# Patient Record
Sex: FEMALE | Race: WHITE | NOT HISPANIC OR LATINO | Employment: UNEMPLOYED | URBAN - METROPOLITAN AREA
[De-identification: names, ages, dates, MRNs, and addresses within clinical notes are randomized per-mention and may not be internally consistent; named-entity substitution may affect disease eponyms.]

---
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
## 2024-01-30 ENCOUNTER — TELEPHONE (OUTPATIENT)
Dept: PEDIATRICS | Facility: CLINIC | Age: 19
End: 2024-01-30
Payer: COMMERCIAL

## 2024-01-30 DIAGNOSIS — F41.9 ANXIETY: ICD-10-CM

## 2024-01-30 RX ORDER — HYDROXYZINE HYDROCHLORIDE 25 MG/1
TABLET, FILM COATED ORAL
Qty: 60 TABLET | Refills: 1 | OUTPATIENT
Start: 2024-01-30

## 2024-01-30 NOTE — TELEPHONE ENCOUNTER
Pt not seen in over 1 year  Dr. Malone no longer at clinic  Pt needs appt before refill can be provided    NW

## 2024-02-09 ENCOUNTER — TELEPHONE (OUTPATIENT)
Dept: OPHTHALMOLOGY | Facility: CLINIC | Age: 19
End: 2024-02-09
Payer: COMMERCIAL

## 2024-02-09 NOTE — TELEPHONE ENCOUNTER
Spoke to mother and would like to schedule first week in march when back from college    Prefers to see Dr. Miles for complete eye exam.    Scheduled 3-5-2024 at [facility] location and mother aware of date/time/location at [facility]    Blake Cote RN 4:36 PM 02/09/24

## 2024-02-09 NOTE — TELEPHONE ENCOUNTER
Symptoms times 2 months    Scheduled first available new TBI time slot with Dr. Bennett for exam next Thursday at 1030--9th floor [facility] location.    Note to patient communicator to assist in reaching out to confirm appt/review rescheduling options if indicated.    Blake Cote RN 2:15 PM 02/09/24

## 2024-02-09 NOTE — TELEPHONE ENCOUNTER
Health Call Center    Phone Message    May a detailed message be left on voicemail: yes     Reason for Call: Other: Patient's mother called back to chico Cheng's appointment on 2/15 to 3/5 with Dr. Bennett.  Patients mother is requesting a call back to speak with the clinic about scheduling an annual eye exam instead. Writer did not want to schedule against the already scheduled TBI appointment type.  Best number to reach is [phone]. Thank you!       Action Taken: Message routed to:  [facility] ([facility] Eye     Travel Screening: Not Applicable

## 2024-02-09 NOTE — TELEPHONE ENCOUNTER
[facility] Call Center    Phone Message    May a detailed message be left on voicemail: yes     Reason for Call: Symptoms or Concerns     If patient has red-flag symptoms, warm transfer to triage line    Current symptom or concern: per caller mother Tianna, change in vision when pt looks at lights they are elongated mainly at night, pt joined call confirmed and advised no blurriness or pain. Not noticed it during the day but at night definately.    Symptoms have been present for:  2 months, pt was didn't realize that this was not normal    Has patient previously been seen for this? No    By : N/A    Date: N/A    Are there any new or worsening symptoms? Yes:     Action Taken: Message routed to:  [facility] eye    Travel Screening: Not Applicable

## 2024-02-23 NOTE — TELEPHONE ENCOUNTER
[facility] Call Center    Phone Message    May a detailed message be left on voicemail: yes     Reason for Call: Other: Mom Tianna calling back to see of we can squeeze patient in at [facility] instead of [facility] with Dr Miles due to hospital based clinic billing would be more. Patient is currently scheduled at [facility] on 3/4 with Dr Miles and will be available on 3/1 in the PM and 3/4-3/8 all day.     Please call mom back at [phone]. Thank you.    Action Taken: Message routed to:  [facility] ([facility]): Eye    Travel Screening: Not Applicable

## 2024-02-26 NOTE — TELEPHONE ENCOUNTER
Called and LVM     R/S appointment with dr. TOBIAS in a ROSALIND spot for 3/1 @ 1230 pm at the [address] [place] [address] location     Judy Washington Communication Facilitator on 2/26/2024 at 11:01 AM

## 2024-02-27 ENCOUNTER — TELEPHONE (OUTPATIENT)
Dept: OPHTHALMOLOGY | Facility: CLINIC | Age: 19
End: 2024-02-27
Payer: COMMERCIAL

## 2024-03-01 ENCOUNTER — OFFICE VISIT (OUTPATIENT)
Dept: OPHTHALMOLOGY | Facility: CLINIC | Age: 19
End: 2024-03-01
Payer: COMMERCIAL

## 2024-03-01 DIAGNOSIS — H10.13 ALLERGIC CONJUNCTIVITIS OF BOTH EYES: Primary | ICD-10-CM

## 2024-03-01 DIAGNOSIS — Z01.00 EMMETROPIA: ICD-10-CM

## 2024-03-01 PROCEDURE — 92004 COMPRE OPH EXAM NEW PT 1/>: CPT | Performed by: OPTOMETRIST

## 2024-03-01 RX ORDER — OLOPATADINE HYDROCHLORIDE 2 MG/ML
1 SOLUTION/ DROPS OPHTHALMIC DAILY
Qty: 2.5 ML | Refills: 11 | Status: SHIPPED | OUTPATIENT
Start: 2024-03-01

## 2024-03-01 ASSESSMENT — REFRACTION
OD_SPHERE: -0.25
OS_SPHERE: -0.25
OS_CYLINDER: SPHERE
OD_CYLINDER: SPHERE
OD_SPHERE: PLANO
OS_SPHERE: PLANO
OS_CYLINDER: +0.25
OS_AXIS: 015

## 2024-03-01 ASSESSMENT — REFRACTION_MANIFEST
OS_CYLINDER: +0.25
OS_AXIS: 015
OD_SPHERE: PLANO
OS_SPHERE: -0.25
OD_CYLINDER: SPHERE

## 2024-03-01 ASSESSMENT — TONOMETRY
OD_IOP_MMHG: 20
IOP_METHOD: ICARE
OS_IOP_MMHG: 19

## 2024-03-01 ASSESSMENT — VISUAL ACUITY
OD_CC: J1+
OS_SC: 20/20
METHOD: SNELLEN - LINEAR
OS_CC: J1+
OD_SC: 20/20

## 2024-03-01 ASSESSMENT — CONF VISUAL FIELD
OD_NORMAL: 1
OD_INFERIOR_TEMPORAL_RESTRICTION: 0
OS_INFERIOR_NASAL_RESTRICTION: 0
OS_INFERIOR_TEMPORAL_RESTRICTION: 0
OS_SUPERIOR_NASAL_RESTRICTION: 0
OD_SUPERIOR_TEMPORAL_RESTRICTION: 0
OS_SUPERIOR_TEMPORAL_RESTRICTION: 0
OD_INFERIOR_NASAL_RESTRICTION: 0
OS_NORMAL: 1
OD_SUPERIOR_NASAL_RESTRICTION: 0
METHOD: COUNTING FINGERS

## 2024-03-01 ASSESSMENT — SLIT LAMP EXAM - LIDS
COMMENTS: NORMAL
COMMENTS: NORMAL

## 2024-03-01 ASSESSMENT — CUP TO DISC RATIO
OS_RATIO: .25
OD_RATIO: .25

## 2024-03-01 ASSESSMENT — EXTERNAL EXAM - RIGHT EYE: OD_EXAM: NORMAL

## 2024-03-01 ASSESSMENT — EXTERNAL EXAM - LEFT EYE: OS_EXAM: NORMAL

## 2024-03-01 NOTE — PROGRESS NOTES
A/P  1.) Emmetropia  -Noticing nighttime driving sometimes difficult with lights  -Completely emmetropic. No latent Rx, no cyl. Kittery sphere OU  -May be related to larger pupil at night - option for brimonidine prn but pt feels she can do fine without it  -Dilated ocular health unremarkable OU    2.) Allergic conjunctivitis  -Intermittent, gets occasional hives and itching  -Takes oral antihistamine when hives occur. Add Pataday eyedrop during periods of eye itching    Monitor 1-2 years comprehensive, sooner prn    I have confirmed the patient's CC, HPI and reviewed Past Medical History, Past Surgical History, Social History, Family History, Problem List, Medication List and agree with Tech note.     Geovanna Miles, OD FAAO [facility]

## 2024-03-01 NOTE — NURSING NOTE
Chief Complaints and History of Present Illnesses   Patient presents with    COMPREHENSIVE EYE EXAM     Comprehensive exam     Chief Complaint(s) and History of Present Illness(es)       COMPREHENSIVE EYE EXAM              Laterality: both eyes    Associated symptoms: Negative for eye pain    Treatments tried: no treatments    Pain scale: 0/10    Comments: Comprehensive exam              Comments    Noticed that lights at night seem to appear long or glare. Has been going on for some time around [place] this past year felt it may not be normal.   No headaches or squinting.   Can see OK distance and near each eye.   Glasses in the past but did not wear them.     The past 2 years Spring and Fall months each eye get itching with bumps around them and swell. Takes Benadryl PRN to manage this.     Vikki Rae, COT COT 12:31 PM March 1, 2024

## 2024-03-01 NOTE — NURSING NOTE
Chief Complaints and History of Present Illnesses   Patient presents with    COMPREHENSIVE EYE EXAM     Comprehensive exam     Chief Complaint(s) and History of Present Illness(es)       COMPREHENSIVE EYE EXAM              Laterality: both eyes    Associated symptoms: Negative for eye pain    Treatments tried: no treatments    Pain scale: 0/10    Comments: Comprehensive exam              Comments    Noticed that lights at night seem to appear long or glare. Has been going on for some time around [place] this past year felt it may not be normal.   No headaches or squinting.   Can see OK distance and near each eye.   Glasses in the past but did not wear them.     Vikki Rae, COT COT 12:31 PM March 1, 2024

## 2024-11-10 ENCOUNTER — HEALTH MAINTENANCE LETTER (OUTPATIENT)
Age: 19
End: 2024-11-10